# Patient Record
Sex: FEMALE | URBAN - METROPOLITAN AREA
[De-identification: names, ages, dates, MRNs, and addresses within clinical notes are randomized per-mention and may not be internally consistent; named-entity substitution may affect disease eponyms.]

---

## 2022-10-25 ENCOUNTER — TELEPHONE (OUTPATIENT)
Dept: OBSTETRICS AND GYNECOLOGY | Facility: CLINIC | Age: 37
End: 2022-10-25

## 2022-10-25 NOTE — TELEPHONE ENCOUNTER
----- Message from Anne Mondragon sent at 10/25/2022 12:00 PM CDT -----  Contact: [phone]  Type:  Same Day Appointment Request    Caller is requesting a same day appointment.  Caller declined first available appointment listed below.      Name of Caller:  Pt   When is the first available appointment?  -  Symptoms:  Broken Birth control implant   Best Call Back Number:  [phone]    Pt states she has medicaid for her insurance

## 2022-10-25 NOTE — TELEPHONE ENCOUNTER
Contacted pt and gave her the escalation line as we are currently not accepting new Medicaid pts.  Pt voiced understanding.